# Patient Record
Sex: FEMALE | Race: WHITE | URBAN - NONMETROPOLITAN AREA
[De-identification: names, ages, dates, MRNs, and addresses within clinical notes are randomized per-mention and may not be internally consistent; named-entity substitution may affect disease eponyms.]

---

## 2017-12-12 NOTE — PATIENT DISCUSSION
Surgery  Counseling: I have discussed the option of glasses versus cataract surgery versus following. It was explained that when vision no longer meets the patient's visual needs and a new prescription for glasses is not likely to improve the patient's visual symptoms, the option of cataract surgery is a reasonable next step. It was explained that there is no guarantee that removing the cataract will improve their visual symptoms, however; it is believed that the cataract is contributing to the patient's visual impairment and surgery may significantly improve both the visual and functional status of the patient. The risks, benefits and alternatives of surgery were discussed with the patient. After this discussion, the patient desires to proceed with cataract surgery with implantation of an intraocular lens to improve vision for care for his daily needs, drive safely, read large print, read small print and watch TV.

## 2017-12-12 NOTE — PATIENT DISCUSSION
New Prescription: Pred-Gati-Brom: Ophthalmic Drops: 1 drop three times a day as directed in right eye

## 2017-12-12 NOTE — PATIENT DISCUSSION
CATARACT, OU - VISUALLY SIGNIFICANT. SCHEDULE SX OD THEN LATER IN OS IF VISUAL SYMPTOMS PERSIST.  GLS RX GIVEN TO FILL IF DESIRES IN THE EVENT PT DOES NOT PROCEED W/ SX. PT IS PROCEEDING WITH TRADITIONAL CATARACT SURGERY

## 2017-12-21 NOTE — PATIENT DISCUSSION
Post-Op Instructions: The patient was instructed in the proper use of post-operative eye drops: pred-gati-brom in the surgical eye 3 times per day for 2 weeks, then 2 times per day for 1 week, then 1 time per day for 1 week, then discontinue. Recommended to the patient to continue PRN vitamins for 90 days or until their 90 days supply is gone. Call back instructions, retinal detachment and endophthalmitis precautions given.

## 2017-12-21 NOTE — PATIENT DISCUSSION
Surgery  Counseling: I have discussed the option of glasses versus cataract surgery versus following . It was explained that when vision no longer meets the patient?s visual needs and a new prescription for glasses is not likely to improve all of the patient?s visual symptoms, the option of cataract surgery is a reasonable next step. It was explained that there is no guarantee that removing the cataract will improve their visual symptoms, however; it is believed that the cataract is contributing to the patient's visual impairment and surgery may significantly improve both the visual and functional status of the patient. The risks, benefits and alternatives of surgery were discussed with the patient. After this discussion, the patient desires to proceed with cataract surgery with implantation of an intraocular lens to improve vision for daily quality of life.

## 2018-01-11 NOTE — PATIENT DISCUSSION
***This patient had traditional cataract surgery performed. A monofocal  IOL was placed to achieve a target refraction of plano (which should provide them with satisfactory distance vision with the aid of glasses/contact lenses). ***

## 2021-06-07 ENCOUNTER — IMPORTED ENCOUNTER (OUTPATIENT)
Dept: URBAN - NONMETROPOLITAN AREA CLINIC 1 | Facility: CLINIC | Age: 41
End: 2021-06-07

## 2021-06-07 PROBLEM — H52.4: Noted: 2021-06-07

## 2021-06-07 PROCEDURE — 92004 COMPRE OPH EXAM NEW PT 1/>: CPT

## 2021-06-07 PROCEDURE — 92015 DETERMINE REFRACTIVE STATE: CPT

## 2021-06-07 NOTE — PATIENT DISCUSSION
Clinical Emmetropia OU w/Presbyopia-  discussed findings w/patient-  no spectacle Rx issued-  OTC readers PRN-  RTC 1 year or prn; 's Notes: MR 6/7/2021DFE 6/7/2021

## 2022-04-10 ASSESSMENT — VISUAL ACUITY
OD_CC: 20/20-1
OS_CC: 20/20

## 2022-04-10 ASSESSMENT — TONOMETRY
OD_IOP_MMHG: 16
OS_IOP_MMHG: 16